# Patient Record
Sex: FEMALE | URBAN - METROPOLITAN AREA
[De-identification: names, ages, dates, MRNs, and addresses within clinical notes are randomized per-mention and may not be internally consistent; named-entity substitution may affect disease eponyms.]

---

## 2017-09-01 ENCOUNTER — THERAPY VISIT (OUTPATIENT)
Dept: PHYSICAL THERAPY | Facility: CLINIC | Age: 57
End: 2017-09-01
Payer: COMMERCIAL

## 2017-09-01 DIAGNOSIS — G24.3 SPASMODIC TORTICOLLIS: Primary | ICD-10-CM

## 2017-09-01 DIAGNOSIS — M54.2 NECK PAIN: ICD-10-CM

## 2017-09-01 PROCEDURE — 97161 PT EVAL LOW COMPLEX 20 MIN: CPT | Mod: GP | Performed by: PHYSICAL THERAPIST

## 2017-09-01 PROCEDURE — 97110 THERAPEUTIC EXERCISES: CPT | Mod: GP | Performed by: PHYSICAL THERAPIST

## 2017-09-01 PROCEDURE — 97112 NEUROMUSCULAR REEDUCATION: CPT | Mod: GP | Performed by: PHYSICAL THERAPIST

## 2017-09-01 NOTE — LETTER
[facility]  [address]  [place] [address]  [phone]    2017    Re: Marlene Paredes   :   1960  MRN:  [id number]   REFERRING PHYSICIAN:   Carey Soares    [facility]    Date of Initial Evaluation:  2017  Visits:  Rxs Used: 1  Reason for Referral:     Spasmodic torticollis  Neck pain    EVALUATION SUMMARY    Subjective:  Patient is a 57 year old female presenting with rehab cervical spine hpi.   Marlene Paredes is a 57 year old female with a cervical spine condition.  Condition occurred with:  Other reason.  Condition occurred: for unknown reasons.  This is a new condition  About 3 weeks ago on or about 8/10/2017, my neck started to turning to the right, instead of looking forward.  I had two neck injuries when I was young.   I had whiplash, but I was able to get over it.  I have not been working  Since 2015  I was working in the [employer] business.  I am disabled, not driving.  Head tilt to the left and rotated right,  Notice when I go to sleep.    Patient reports pain:  Cervical right side.  Radiates to:  Shoulder right, upper arm right, elbow right, lower arm right and hand right (overuse problems of right shoulder down into the hand).  Quality: tightness. and is intermittent and reported as 3/10.  Associated symptoms:  Headache, ringing in ears and visual disturbances (ringing in the ear last a few years, 2009 floaters, gray spots in the eyes.). Pain is worse in the P.M. (stronger at night).  Symptoms are exacerbated by lying down (computer, watching TV) Relieved by: moving the head.  Since onset symptoms are gradually worsening.  Special testing: none.  Previous treatment includes physical therapy (arm and back).  There was moderate improvement following previous treatment.  General health as reported by patient is fair.   Pertinent medical history includes:  High blood pressure, chemical dependency, asthma, depression, thyroid problems, migraines, rheumatoid arthritis and sleep disorder/apnea (gerd, memory issues).  Medical allergies: yes (penicillin).  Other surgeries include:  Other (, breast lump removal).  Current medications:  Anti-depressants, pain medication, high blood pressure medication, thyroid medication and sleep medication (omeprazole).  Current occupation is Disabled, drove over the road truck.    Primary job tasks include:  Prolonged sitting.  Barriers: homeless, living with my brother with an apartment.  Red flags:  None as reported by the patient.                  Objective:  Standing Alignment:    Cervical/Thoracic:  Forward head and thoracic kyphosis increased (head tilt to the left, poor sitting posture)  Re: Marlene Paredes   :   1960    Shoulder/UE:  Rounded shoulders, protracted scapula L and protracted scapula R (internal rotation)  Lumbar:  Lordosis incr  Pelvic:  Iliac crest high R  Flexibility/Screens:   Positive screens:  Cervical  Spine:  Decreased left spine flexibility:  Scalenes; Upper Trap and Levator  Decreased right spine flexibility:  Scalenes; Upper Trap and Levator    Cervical/Thoracic Evaluation  AROM:  AROM Cervical:  Flexion:            WFL  Extension:       WFL  Rotation:         Left: 75% of motion     Right: 75% of motion  Side Bend:      Left: moderate movement     Right:  Moderate movement  Headaches: none  Cervical Myotomes:    C1-2 (Neck Flex): Left:  5    Right: 5  C3 (neck side bend): Left: 5    Right: 5  C4 (shrug):  Left: 5    Right: 5  C5 (Deltoid):  Left: 5    Right: 5  C6 (Biceps):  Left: 5    Right: 5  C7 (Triceps):  Left: 5    Right: 5  C8 (Thumb Ext): Left: 5    Right: 5  T1 (Intrinsics): Left: 5    Right: 5  Cervical Palpation:    Tenderness present at Left:    Scalenes; Erector Spinae and Suboccipitals  Tenderness present at Right:    Scalenes; Erector  Spinae and Suboccipitals  Cervical Stability/Joint Clearing:    Left positive at:Mobility  Right positive at:  Mobility  Spinal Segmental Conclusions:    Level:  Hypo at T1, T2 and C2    Assessment/Plan:    Patient is a 57 year old female with cervical complaints.    Patient has the following significant findings with corresponding treatment plan.                Diagnosis 1:  torticollis  Pain -  manual therapy, self management, education, directional preference exercise and home program  Decreased ROM/flexibility - manual therapy, therapeutic exercise, therapeutic activity and home program  Decreased joint mobility - manual therapy, therapeutic exercise, therapeutic activity and home program  Impaired muscle performance - neuro re-education and home program  Decreased function - therapeutic activities and home program  Impaired posture - neuro re-education, therapeutic activities and home program  Evaluation ongoing      Re: Marlene Paredes   :   1960    Therapy Evaluation Codes:   1) History comprised of:   Personal factors that impact the plan of care:      Cognition, Living environment, Past/current experiences and previous profession.    Comorbidity factors that impact the plan of care are:      Asthma, Chemical Dependency, Depression, High blood pressure, Migraines/headaches, Overweight and thyroid, memory problems and gerd..     Medications impacting care: Anti-depressant, High blood pressure, Pain, Sleep and thyroid.  2) Examination of Body Systems comprised of:   Body structures and functions that impact the plan of care:      Cervical spine and Thoracic Spine.   Activity limitations that impact the plan of care are:      Reading/Computer work, Sitting, Sleeping and posture.  3) Clinical presentation characteristics are:   Stable/Uncomplicated.  4) Decision-Making    Low complexity using standardized patient assessment instrument and/or measureable assessment of functional outcome.  Cumulative  Therapy Evaluation is: Low complexity.    Previous and current functional limitations:  (See Goal Flow Sheet for this information)    Short term and Long term goals: (See Goal Flow Sheet for this information)     Communication ability:  Patient appears to be able to clearly communicate and understand verbal and written communication and follow directions correctly.  Patient reported some memory issues and will need handouts with instructions.  Treatment Explanation - The following has been discussed with the patient:   RX ordered/plan of care  Possible risks and side effects  This patient would benefit from PT intervention to resume normal activities.   Rehab potential is good.    Frequency:  1 X week, once daily  Duration:  for 6 weeks  Discharge Plan:  Achieve all LTG.  Independent in home treatment program.    Thank you for your referral.    INQUIRIES  Therapist: Pat Bryant, PT  [facility]  [address] [address]  Phone: [phone]  Fax: [phone]

## 2017-09-01 NOTE — MR AVS SNAPSHOT
After Visit Summary   9/1/2017    Marlene Paredes    MRN: [id number]           Patient Information     Date Of Birth          1960        Visit Information        Provider Department      9/1/2017 3:30 PM Pat Bryant, PT [facility] Physical [facility]        Today's Diagnoses     Spasmodic torticollis    -  1    Neck pain           Follow-ups after your visit        Your next 10 appointments already scheduled     Sep 06, 2017  2:50 PM CDT   [facility] Spine with Pat Braynt PT   [facility] Physical Therapy ([facility])    [address] [place] [address] [address]  [facility] [address]   [phone]            Sep 13, 2017  2:50 PM CDT   VILMA Spine with Pat Bryant PT   [facility] Physical Therapy ([facility])    [address] [place] [address] [address]  [facility] [address]   [phone]              Who to contact     If you have questions or need follow up information about today's clinic visit or your schedule please contact [facility]LET[facility] PHYSICAL [facility] directly at [phone].  Normal or non-critical lab and imaging results will be communicated to you by MyChart, letter or phone within 4 business days after the clinic has received the results. If you do not hear from us within 7 days, please contact the clinic through [employer] or phone. If you have a critical or abnormal lab result, we will notify you by phone as soon as possible.  Submit refill requests through "[employer]" or call your pharmacy and they will forward the refill request to us. Please allow 3 business days for your refill to be completed.          Additional Information About Your Visit        MyChart Information     "[employer]" lets you send messages to your doctor, view your test results, renew your prescriptions, schedule appointments and more. To sign up,  "go to [email or web address] . Click on \"Log in\" on the left side of the screen, which will take you to the Welcome page. Then click on \"Sign up Now\" on the right side of the page.     You will be asked to enter the access code listed below, as well as some personal information. Please follow the directions to create your username and password.     Your access code is: [email or web address]  Expires: 2017 10:05 PM     Your access code will  in 90 days. If you need help or a new code, please call your [place] clinic or [phone].        Care EveryWhere ID     This is your Care EveryWhere ID. This could be used by other organizations to access your [place] medical records  [id number]         Blood Pressure from Last 3 Encounters:   No data found for BP    Weight from Last 3 Encounters:   No data found for Wt              We Performed the Following     [facility]ILMA Inital Eval Report     Neuromuscular Re-Education     PT Eval, Low Complexity (13852)     Therapeutic Exercises        Primary Care Provider    None Specified       No primary provider on file.        Equal Access to Services     [facility]: Hadii imani Mehta, waalexisda terri, qaybta wilyalkavya barba, janes mclean . So [facility] [phone].    ATENCIÓN: Si habla español, tiene a segura disposición servicios gratuitos de asistencia lingüística. Irinaame al [phone].    We comply with applicable federal civil rights laws and [place] laws. We do not discriminate on the basis of race, color, national origin, age, disability sex, sexual orientation or gender identity.            Thank you!     Thank you for choosing [facility] PHYSICAL THERAP[facility]  for your care. Our goal is always to provide you with excellent care. Hearing back from our patients is one way we can continue to improve our services. Please take a few minutes to complete the written survey that you may receive in the mail " after your visit with us. Thank you!             Your Updated Medication List - Protect others around you: Learn how to safely use, store and throw away your medicines at www.disposemymeds.org.      Notice  As of 9/1/2017 11:59 PM    You have not been prescribed any medications.

## 2017-09-01 NOTE — PROGRESS NOTES
[facility] Initial Evaluation    Subjective:    Patient is a 57 year old female presenting with rehab cervical spine hpi.   Marlene Paredes is a 57 year old female with a cervical spine condition.  Condition occurred with:  Other reason.  Condition occurred: for unknown reasons.  This is a new condition  About 3 weeks ago on or about 8/10/2017, my neck started to turning to the right, instead of looking forward.  I had two neck injuries when I was young.   I had whiplash, but I was able to get over it.  I have not been working  Since 2015  I was working in the [employer] business.  I am disabled, not driving.  Head tilt to the left and rotated right,  Notice when I go to sleep..    Patient reports pain:  Cervical right side.  Radiates to:  Shoulder right, upper arm right, elbow right, lower arm right and hand right (overuse problems of right shoulder down into the hand).  Quality: tightness. and is intermittent and reported as 3/10.  Associated symptoms:  Headache, ringing in ears and visual disturbances (ringing in the ear last a few years, 2009 floaters, gray spots in the eyes.). Pain is worse in the P.M. (stronger at night).  Symptoms are exacerbated by lying down (computer, watching TV) Relieved by: moving the head.  Since onset symptoms are gradually worsening.  Special testing: none.  Previous treatment includes physical therapy (arm and back).  There was moderate improvement following previous treatment.  General health as reported by patient is fair.  Pertinent medical history includes:  High blood pressure, chemical dependency, asthma, depression, thyroid problems, migraines, rheumatoid arthritis and sleep disorder/apnea (gerd, memory issues).  Medical allergies: yes (penicillin).  Other surgeries include:  Other (, breast lump removal).  Current medications:  Anti-depressants, pain medication, high blood pressure medication, thyroid medication and sleep medication  (omeprazole).  Current occupation is Disabled, drove over the road truck  .    Primary job tasks include:  Prolonged sitting.    Barriers: homeless, living with my brother with an apartment.    Red flags:  None as reported by the patient.                        Objective:    Standing Alignment:    Cervical/Thoracic:  Forward head and thoracic kyphosis increased (head tilt to the left, poor sitting posture)  Shoulder/UE:  Rounded shoulders, protracted scapula L and protracted scapula R (internal rotation)  Lumbar:  Lordosis incr  Pelvic:  Iliac crest high R              Flexibility/Screens:   Positive screens:  Cervical      Spine:  Decreased left spine flexibility:  Scalenes; Upper Trap and Levator    Decreased right spine flexibility:  Scalenes; Upper Trap and Levator                  Cervical/Thoracic Evaluation    AROM:  AROM Cervical:    Flexion:            WFL  Extension:       WFL  Rotation:         Left: 75% of motion     Right: 75% of motion  Side Bend:      Left: moderate movement     Right:  Moderate movement      Headaches: none  Cervical Myotomes:    C1-2 (Neck Flex): Left:  5    Right: 5  C3 (neck side bend): Left: 5    Right: 5  C4 (shrug):  Left: 5    Right: 5  C5 (Deltoid):  Left: 5    Right: 5  C6 (Biceps):  Left: 5    Right: 5  C7 (Triceps):  Left: 5    Right: 5  C8 (Thumb Ext): Left: 5    Right: 5  T1 (Intrinsics): Left: 5    Right: 5        Cervical Palpation:    Tenderness present at Left:    Scalenes; Erector Spinae and Suboccipitals  Tenderness present at Right:    Scalenes; Erector Spinae and Suboccipitals    Cervical Stability/Joint Clearing:    Left positive at:Mobility  Right positive at:  Mobility  Spinal Segmental Conclusions:    Level:  Hypo at T1, T2 and C2                                                General     ROS    Assessment/Plan:      Patient is a 57 year old female with cervical complaints.    Patient has the following significant findings with corresponding treatment plan.                 Diagnosis 1:  torticollis  Pain -  manual therapy, self management, education, directional preference exercise and home program  Decreased ROM/flexibility - manual therapy, therapeutic exercise, therapeutic activity and home program  Decreased joint mobility - manual therapy, therapeutic exercise, therapeutic activity and home program  Impaired muscle performance - neuro re-education and home program  Decreased function - therapeutic activities and home program  Impaired posture - neuro re-education, therapeutic activities and home program  Evaluation ongoing    Therapy Evaluation Codes:   1) History comprised of:   Personal factors that impact the plan of care:      Cognition, Living environment, Past/current experiences and previous profession.    Comorbidity factors that impact the plan of care are:      Asthma, Chemical Dependency, Depression, High blood pressure, Migraines/headaches, Overweight and thyroid, memory problems and gerd..     Medications impacting care: Anti-depressant, High blood pressure, Pain, Sleep and thyroid.  2) Examination of Body Systems comprised of:   Body structures and functions that impact the plan of care:      Cervical spine and Thoracic Spine.   Activity limitations that impact the plan of care are:      Reading/Computer work, Sitting, Sleeping and posture.  3) Clinical presentation characteristics are:   Stable/Uncomplicated.  4) Decision-Making    Low complexity using standardized patient assessment instrument and/or measureable assessment of functional outcome.  Cumulative Therapy Evaluation is: Low complexity.    Previous and current functional limitations:  (See Goal Flow Sheet for this information)    Short term and Long term goals: (See Goal Flow Sheet for this information)     Communication ability:  Patient appears to be able to clearly communicate and understand verbal and written communication and follow directions correctly.  Patient reported some memory  issues and will need handouts with instructions.  Treatment Explanation - The following has been discussed with the patient:   RX ordered/plan of care  Possible risks and side effects  This patient would benefit from PT intervention to resume normal activities.   Rehab potential is good.    Frequency:  1 X week, once daily  Duration:  for 6 weeks  Discharge Plan:  Achieve all LTG.  Independent in home treatment program.    Please refer to the daily flowsheet for treatment today, total treatment time and time spent performing 1:1 timed codes.

## 2017-09-02 PROBLEM — G24.3 SPASMODIC TORTICOLLIS: Status: ACTIVE | Noted: 2017-09-02

## 2017-09-02 PROBLEM — M54.2 NECK PAIN: Status: ACTIVE | Noted: 2017-09-02

## 2017-09-13 ENCOUNTER — THERAPY VISIT (OUTPATIENT)
Dept: PHYSICAL THERAPY | Facility: CLINIC | Age: 57
End: 2017-09-13
Payer: COMMERCIAL

## 2017-09-13 DIAGNOSIS — G24.3 SPASMODIC TORTICOLLIS: ICD-10-CM

## 2017-09-13 DIAGNOSIS — M54.2 NECK PAIN: ICD-10-CM

## 2017-09-13 PROCEDURE — 97112 NEUROMUSCULAR REEDUCATION: CPT | Mod: GP | Performed by: PHYSICAL THERAPY ASSISTANT

## 2017-09-13 PROCEDURE — 97110 THERAPEUTIC EXERCISES: CPT | Mod: GP | Performed by: PHYSICAL THERAPY ASSISTANT

## 2017-09-13 NOTE — MR AVS SNAPSHOT
"              After Visit Summary   9/13/2017    Marlene Paredes    MRN: [id number]           Patient Information     Date Of Birth          1960        Visit Information        Provider Department      9/13/2017 2:50 PM Lyndsey Tay PTA [facility] Physical Therapy        Today's Diagnoses     Spasmodic torticollis        Neck pain           Follow-ups after your visit        Your next 10 appointments already scheduled     Sep 27, 2017  2:50 PM CDT   VILMA Spine with Lyndsey Tay PTA   [facility] Physical Therapy ([facility] [place])    [address] [facility] [address]  [facility] [address] [phone]              Who to contact     If you have questions or need follow up information about today's clinic visit or your schedule please contact [facility] PHYSICAL THERAPY directly at [phone].  Normal or non-critical lab and imaging results will be communicated to you by [employer], letter or phone within 4 business days after the clinic has received the results. If you do not hear from us within 7 days, please contact the clinic through [employer] or phone. If you have a critical or abnormal lab result, we will notify you by phone as soon as possible.  Submit refill requests through [employer] or call your pharmacy and they will forward the refill request to us. Please allow 3 business days for your refill to be completed.          Additional Information About Your Visit        [employer] Information     [employer] lets you send messages to your doctor, view your test results, renew your prescriptions, schedule appointments and more. To sign up, go to [email or web address] . Click on \"Log in\" on the left side of the screen, which will take you to the Welcome page. Then click on \"Sign up Now\" on the right side of the page.     You will be asked to enter the access code listed below, as " well as some personal information. Please follow the directions to create your username and password.     Your access code is: [email or web address]  Expires: 2017 10:05 PM     Your access code will  in 90 days. If you need help or a new code, please call your [place] clinic or [phone].        Care EveryWhere ID     This is your Care EveryWhere ID. This could be used by other organizations to access your [place] medical records  [id number]         Blood Pressure from Last 3 Encounters:   No data found for BP    Weight from Last 3 Encounters:   No data found for Wt              We Performed the Following     NEUROMUSCULAR RE-EDUCATION     THERAPEUTIC EXERCISES        Primary Care Provider    None Specified       No primary provider on file.        Equal Access to Services     [facility] : Demar Mehta, jigar murray, jeri barba, janes mclean . So [facility] [phone].    ATENCIÓN: Si habla español, tiene a segura disposición servicios gratuitos de asistencia lingüística. Llame al [phone].    We comply with applicable federal civil rights laws and [place] laws. We do not discriminate on the basis of race, color, national origin, age, disability sex, sexual orientation or gender identity.            Thank you!     Thank you for choosing [facility] PHYSICAL THERAPY  for your care. Our goal is always to provide you with excellent care. Hearing back from our patients is one way we can continue to improve our services. Please take a few minutes to complete the written survey that you may receive in the mail after your visit with us. Thank you!             Your Updated Medication List - Protect others around you: Learn how to safely use, store and throw away your medicines at www.disposemymeds.org.      Notice  As of 2017  4:02 PM    You have not been prescribed any medications.

## 2018-02-28 ENCOUNTER — THERAPY VISIT (OUTPATIENT)
Dept: PHYSICAL THERAPY | Facility: CLINIC | Age: 58
End: 2018-02-28
Payer: COMMERCIAL

## 2018-02-28 DIAGNOSIS — M25.511 ACUTE PAIN OF RIGHT SHOULDER: Primary | ICD-10-CM

## 2018-02-28 PROCEDURE — G8984 CARRY CURRENT STATUS: HCPCS | Mod: GP

## 2018-02-28 PROCEDURE — 97161 PT EVAL LOW COMPLEX 20 MIN: CPT | Mod: GP

## 2018-02-28 PROCEDURE — 97110 THERAPEUTIC EXERCISES: CPT | Mod: GP

## 2018-02-28 PROCEDURE — G8985 CARRY GOAL STATUS: HCPCS | Mod: GP

## 2018-02-28 NOTE — MR AVS SNAPSHOT
After Visit Summary   2/28/2018    Marlene Paredes    MRN: [id number]           Patient Information     Date Of Birth          1960        Visit Information        Provider Department      2/28/2018 11:40 AM Kem Granda, PT [place] [facility]        Today's Diagnoses     Acute pain of right shoulder    -  1       Follow-ups after your visit        Your next 10 appointments already scheduled     Mar 07, 2018  9:20 AM CST   VILMA Extremity with Charlie Ly PT   [place] [facility] ([facility] [place])    [address]  [address]  [place] [address]   [phone]            Mar 14, 2018 12:50 PM CDT   (Arrive by 12:35 PM)   VILMA Extremity with Kem Granda PT   [facility] ([facility] [place])    [address]  [address]  [place] [address]   [phone]            Mar 21, 2018 11:00 AM CDT   (Arrive by 10:45 AM)   VILMA Extremity with Kem Granda, PT   [facility] ([facility])    [address]  [address]  [place] [address]   [phone]              Who to contact     If you have questions or need follow up information about today's clinic visit or your schedule please contact [place] [facility] directly at [phone].  Normal or non-critical lab and imaging results will be communicated to you by localbaconhart, letter or phone within 4 business days after the clinic has received the results. If you do not hear from us within 7 days, please contact the clinic through localbaconhart or phone. If you have a critical or abnormal lab result, we will notify you by phone as soon as possible.  Submit refill requests through [employer] or call your pharmacy and they will forward the refill request to us. Please allow 3 business  "days for your refill to be completed.          Additional Information About Your Visit        MyChart Information     [employer] lets you send messages to your doctor, view your test results, renew your prescriptions, schedule appointments and more. To sign up, go to [email or web address] . Click on \"Log in\" on the left side of the screen, which will take you to the Welcome page. Then click on \"Sign up Now\" on the right side of the page.     You will be asked to enter the access code listed below, as well as some personal information. Please follow the directions to create your username and password.     Your access code is: [email or web address]  Expires: 2018 12:18 PM     Your access code will  in 90 days. If you need help or a new code, please call your [place] clinic or [phone].        Care EveryWhere ID     This is your Care EveryWhere ID. This could be used by other organizations to access your [place] medical records  [id number]         Blood Pressure from Last 3 Encounters:   No data found for BP    Weight from Last 3 Encounters:   No data found for Wt              We Performed the Following     HC PT EVAL, LOW COMPLEXITY     VILMA INITIAL EVAL REPORT     THERAPEUTIC EXERCISES        Primary Care Provider    None Specified       No primary provider on file.        Equal Access to Services     STAN KAUFFMAN : Hadii imani harveyo Tyson, waaxda luleydaadaha, qaybta kaalmada adekierstenda, janes mclean . So [facility] [phone].    ATENCIÓN: Si habla español, tiene a segura disposición servicios gratuitos de asistencia lingüística. Llame al [phone].    We comply with applicable federal civil rights laws and [place] laws. We do not discriminate on the basis of race, color, national origin, age, disability, sex, sexual orientation, or gender identity.            Thank you!     Thank you for choosing [facility]  for your care. Our " goal is always to provide you with excellent care. Hearing back from our patients is one way we can continue to improve our services. Please take a few minutes to complete the written survey that you may receive in the mail after your visit with us. Thank you!             Your Updated Medication List - Protect others around you: Learn how to safely use, store and throw away your medicines at www.disposemymeds.org.      Notice  As of 2/28/2018 12:18 PM    You have not been prescribed any medications.

## 2018-02-28 NOTE — LETTER
[facility]  [address] [place] [address]  [address]  [facility] [address]  [phone]    2018    Re: Marlene Paredes   :   1960  MRN:  [id number]   REFERRING PHYSICIAN:   Geremias Navarro    [facility]    Date of Initial Evaluation:  18   Visits:  Rxs Used: 1  Reason for Referral:  Acute pain of right shoulder    EVALUATION SUMMARY    [facility] Initial Evaluation  Subjective:  Patient is a 57 year old female presenting with rehab right shoulder hpi.   Marlene Paredes is a 57 year old female with a right shoulder condition.  Condition occurred with:  Unknown cause.  Condition occurred: other.  This is a new condition  Onset: 18 of insidious nature.  Bothers with sleep but can get into position of painfree.  Pt RHD.    Patient reports pain:  Anterior, lateral and upper arm.  Radiates to: into R UT.  Pain is described as aching and is intermittent and reported as 9/10.  Associated symptoms:  Loss of motion/stiffness and loss of strength. Worse during: N/A.  Symptoms are exacerbated by using arm overhead and certain positions   Since onset symptoms are gradually worsening.                        Pertinent medical history includes:  High blood pressure, overweight, thyroid problems, implanted device, depression, mental illness, sleep disorder/apnea and other (Pain at night/rest ).  Medical allergies: yes (Penicilan).  Other surgeries include:  Other (c section, breast lump removed ).  Current medications:  Thyroid medication, pain medication, sleep medication and anti-depressants (HRBoss).  Current occupation is Retired     Objective:      Shoulder Evaluation:  ROM:  AROM:    Flexion:  Right:  155  Abduction:  Right:  105  Internal Rotation:  Right:  34  External Rotation:  Right:  40            Re: Marlene Broderick Ramseyman   :    1960          Strength:    Flexion: Left:5-/5   Pain:    Right: 4+/5     Pain:   Abduction:  Left: 5-/5  Pain:    Right: 4/5     Pain:  Internal Rotation:  Left:5-/5     Pain:    Right: 4+/5     Pain:  External Rotation:   Left:5-/5     Pain:   Right:4/5     Pain:    Special Tests:    Right shoulder positive for the following special tests:Impingement  Right shoulder negative for the following special tests:Rotator cuff tear  Palpation:    Right shoulder tenderness present at: Supraspinatus and Infraspinatus      Assessment/Plan:      Patient is a 57 year old female with right side shoulder complaints.    Patient has the following significant findings with corresponding treatment plan.                Diagnosis 1:  R shoulder pain, s&sx consistent with RTC strain  Pain -  manual therapy, self management, education and home program  Decreased ROM/flexibility - manual therapy and therapeutic exercise  Decreased strength - therapeutic exercise and therapeutic activities  Impaired muscle performance - neuro re-education  Decreased function - therapeutic activities    Therapy Evaluation Codes:   1) History comprised of:   Personal factors that impact the plan of care:      Mental Illness.    Comorbidity factors that impact the plan of care are:      Mental illness.     Medications impacting care: None.  2) Examination of Body Systems comprised of:   Body structures and functions that impact the plan of care:      Shoulder.   Activity limitations that impact the plan of care are:      reachong .  3) Clinical presentation characteristics are:   Stable/Uncomplicated.  4) Decision-Making    Low complexity using standardized patient assessment instrument and/or measureable assessment of functional outcome.  Cumulative Therapy Evaluation is: Low complexity.  Previous and current functional limitations:  (See Goal Flow Sheet for this information)    Short term and Long term goals: (See Goal Flow Sheet for this information)    Communication ability:  Patient appears to be able to clearly communicate and understand verbal and written communication and follow directions correctly.  Treatment Explanation - The following has been discussed with the patient:   RX ordered/plan of care  Anticipated outcomes  Possible risks and side effects  This patient would benefit from PT intervention to resume normal activities.   Rehab potential is good.  Frequency:  1 X week, once daily  Duration:  for 8 weeks  Discharge Plan:  Achieve all LTG.  Independent in home treatment program.  Reach maximal therapeutic benefit.    Thank you for your referral.    INQUIRIES  Therapist: Kem Granda, PT   [facility]  [address] [place] [address]  [address]  [facility] [address]  Phone: [phone]  Fax: [phone]

## 2018-02-28 NOTE — PROGRESS NOTES
[place] [facility] Initial Evaluation  Subjective:  Patient is a 57 year old female presenting with rehab right shoulder hpi.   Marlene Paredes is a 57 year old female with a right shoulder condition.  Condition occurred with:  Unknown cause.  Condition occurred: other.  This is a new condition  Onset: 2/14/18 of insidious nature.  Bothers with sleep but can get into position of painfree.  Pt RHD.    Patient reports pain:  Anterior, lateral and upper arm.  Radiates to: into R UT.  Pain is described as aching and is intermittent and reported as 9/10.  Associated symptoms:  Loss of motion/stiffness and loss of strength. Worse during: N/A.  Symptoms are exacerbated by using arm overhead and certain positions   Since onset symptoms are gradually worsening.                                                      Objective:  System                   Shoulder Evaluation:  ROM:  AROM:    Flexion:  Right:  155    Abduction:  Right:  105    Internal Rotation:  Right:  34  External Rotation:  Right:  40                      Strength:    Flexion: Left:5-/5   Pain:    Right: 4+/5     Pain:     Abduction:  Left: 5-/5  Pain:    Right: 4/5     Pain:    Internal Rotation:  Left:5-/5     Pain:    Right: 4+/5     Pain:  External Rotation:   Left:5-/5     Pain:   Right:4/5     Pain:              Special Tests:      Right shoulder positive for the following special tests:Impingement  Right shoulder negative for the following special tests:Rotator cuff tear  Palpation:      Right shoulder tenderness present at: Supraspinatus and Infraspinatus                                     General     ROS    Assessment/Plan:    Patient is a 57 year old female with right side shoulder complaints.    Patient has the following significant findings with corresponding treatment plan.                Diagnosis 1:  R shoulder pain, s&sx consistent with RTC strain  Pain -  manual therapy, self management, education and home program  Decreased  ROM/flexibility - manual therapy and therapeutic exercise  Decreased strength - therapeutic exercise and therapeutic activities  Impaired muscle performance - neuro re-education  Decreased function - therapeutic activities    Therapy Evaluation Codes:   1) History comprised of:   Personal factors that impact the plan of care:      Mental Illness.    Comorbidity factors that impact the plan of care are:      Mental illness.     Medications impacting care: None.  2) Examination of Body Systems comprised of:   Body structures and functions that impact the plan of care:      Shoulder.   Activity limitations that impact the plan of care are:      reachong .  3) Clinical presentation characteristics are:   Stable/Uncomplicated.  4) Decision-Making    Low complexity using standardized patient assessment instrument and/or measureable assessment of functional outcome.  Cumulative Therapy Evaluation is: Low complexity.    Previous and current functional limitations:  (See Goal Flow Sheet for this information)    Short term and Long term goals: (See Goal Flow Sheet for this information)     Communication ability:  Patient appears to be able to clearly communicate and understand verbal and written communication and follow directions correctly.  Treatment Explanation - The following has been discussed with the patient:   RX ordered/plan of care  Anticipated outcomes  Possible risks and side effects  This patient would benefit from PT intervention to resume normal activities.   Rehab potential is good.    Frequency:  1 X week, once daily  Duration:  for 8 weeks  Discharge Plan:  Achieve all LTG.  Independent in home treatment program.  Reach maximal therapeutic benefit.    Please refer to the daily flowsheet for treatment today, total treatment time and time spent performing 1:1 timed codes.

## 2018-03-20 ENCOUNTER — THERAPY VISIT (OUTPATIENT)
Dept: PHYSICAL THERAPY | Facility: CLINIC | Age: 58
End: 2018-03-20
Payer: COMMERCIAL

## 2018-03-20 DIAGNOSIS — M25.511 ACUTE PAIN OF RIGHT SHOULDER: ICD-10-CM

## 2018-03-20 PROCEDURE — 97530 THERAPEUTIC ACTIVITIES: CPT | Mod: GP | Performed by: PHYSICAL THERAPIST

## 2018-03-20 PROCEDURE — 97010 HOT OR COLD PACKS THERAPY: CPT | Mod: GP | Performed by: PHYSICAL THERAPIST

## 2018-03-20 PROCEDURE — 97110 THERAPEUTIC EXERCISES: CPT | Mod: GP | Performed by: PHYSICAL THERAPIST

## 2020-12-07 PROBLEM — M54.2 NECK PAIN: Status: RESOLVED | Noted: 2017-09-02 | Resolved: 2020-12-07

## 2020-12-07 PROBLEM — G24.3 SPASMODIC TORTICOLLIS: Status: RESOLVED | Noted: 2017-09-02 | Resolved: 2020-12-07

## 2020-12-07 NOTE — PROGRESS NOTES
Discharge Note    Progress reporting period is from initial evaluation date (please see noted date below) to Sep 13, 2017.  Linked Episodes   Type: Episode: Status: Noted: Resolved: Last update: Updated by:   PHYSICAL THERAPY neck pain 9/1/2017 Active 9/1/2017 9/13/2017  2:27 PM Pat Bryant, PT      Comments:       Marlene failed to follow up and current status is unknown.  Please see information below for last relevant information on current status.  Patient seen for 2 visits.    SUBJECTIVE  Subjective changes noted by patient:  No pain reported, describes symptoms of weak neck muscles (head will turn to R on occasion).   .  Current pain level is 0/10.     Previous pain level was   .   Changes in function:  Yes (See Goal flowsheet attached for changes in current functional level)  Adverse reaction to treatment or activity: None    OBJECTIVE  Changes noted in objective findings: Improved CROM: all WFL except slight loss with B sidebend. Cues for posture and equal wt shift with standing posture.      ASSESSMENT/PLAN  Diagnosis: neck pain. torticollis   Updated problem list and treatment plan:   Decreased ROM/flexibility - HEP  Impaired posture - HEP  STG/LTGs have been met or progress has been made towards goals:  Yes, please see goal flowsheet for most current information  Assessment of Progress: current status is unknown.    Last current status: Pt is progressing as expected   Self Management Plans:  HEP  I have re-evaluated this patient and find that the nature, scope, duration and intensity of the therapy is appropriate for the medical condition of the patient.  Marlene continues to require the following intervention to meet STG and LTG's:  HEP.    Recommendations:  Discharge with current home program.  Patient to follow up with MD as needed.    Please refer to the daily flowsheet for treatment today, total treatment time and time spent performing 1:1 timed codes.

## 2021-05-26 ENCOUNTER — RECORDS - HEALTHEAST (OUTPATIENT)
Dept: ADMINISTRATIVE | Facility: CLINIC | Age: 61
End: 2021-05-26